# Patient Record
Sex: FEMALE | Race: WHITE | NOT HISPANIC OR LATINO | URBAN - METROPOLITAN AREA
[De-identification: names, ages, dates, MRNs, and addresses within clinical notes are randomized per-mention and may not be internally consistent; named-entity substitution may affect disease eponyms.]

---

## 2021-05-28 ENCOUNTER — OFFICE VISIT (OUTPATIENT)
Dept: URBAN - METROPOLITAN AREA CLINIC 12 | Facility: CLINIC | Age: 36
End: 2021-05-28
Payer: COMMERCIAL

## 2021-05-28 VITALS
HEIGHT: 64 IN | BODY MASS INDEX: 23.63 KG/M2 | DIASTOLIC BLOOD PRESSURE: 73 MMHG | HEART RATE: 83 BPM | SYSTOLIC BLOOD PRESSURE: 116 MMHG | WEIGHT: 138.4 LBS | TEMPERATURE: 97.1 F

## 2021-05-28 DIAGNOSIS — Z83.71 FAMILY HISTORY OF COLONIC POLYPS: ICD-10-CM

## 2021-05-28 DIAGNOSIS — Z34.91 PREGNANT AND NOT YET DELIVERED IN FIRST TRIMESTER: ICD-10-CM

## 2021-05-28 DIAGNOSIS — R19.7 DIARRHEA, UNSPECIFIED TYPE: ICD-10-CM

## 2021-05-28 DIAGNOSIS — R15.2 FECAL URGENCY: ICD-10-CM

## 2021-05-28 PROBLEM — 77386006: Status: ACTIVE | Noted: 2021-05-28

## 2021-05-28 PROCEDURE — 99244 OFF/OP CNSLTJ NEW/EST MOD 40: CPT | Performed by: INTERNAL MEDICINE

## 2021-05-28 RX ORDER — SERTRALINE HYDROCHLORIDE 50 MG/1
1 TABLET TABLET, FILM COATED ORAL ONCE A DAY
Status: ACTIVE | COMMUNITY

## 2021-05-28 RX ORDER — ALPRAZOLAM 0.25 MG/1
1 TABLET TABLET ORAL PRN
Status: ACTIVE | COMMUNITY

## 2021-05-28 RX ORDER — ACETAMINOPHEN 500 MG
1 CAPSULE TABLET ORAL QD
Status: ACTIVE | COMMUNITY

## 2021-05-28 NOTE — HPI-TODAY'S VISIT:
36 yo  woman presents for GI evluation on referral from Dr. Ruiz.  A copy of this note will go to Dr. Ruiz.  Patient has had 2 years or so of intermittent loose stools and diarrhea which seems to be provoked by stress. She has fecal urgency where she will have to have a bowel movement this seems to occur especially when she is traveling. Is become problematic for her and limited her social life as well with her . In fact she states that she takes Imodium 2 tablets before she goes out on car rides. There has been no melena or hematochezia or anemia or weight loss. She states that she is actually gained some weight. She has not noticed any food triggers in fact she has kept a food journal and has not found anything in particular causes her promotes her symptoms other than possibly anxiety. There is no history of dairy or gluten problems. There's no family history of celiac disease or inflammatory bowel disease or similar problems.  Patient did see her primary care provider and was started on Zoloft 25 mg a day which helped slightly. She then did a follow up there and increased it to 50 mg per day but still has the symptoms. She takes Xanax p.r.n. before flight as she has some anxiety increased during turbulence. Otherwise she does not take Xanax very often.  There has been no abdominal pain. There's been no chest pain or shortness of breath or palpitations or fatigue. Patient has been vaccinated with COVID-19 vaccinations x2.   Of note patient is now 6 weeks gestation is playing to see her first OB visit on June 7, 2021.   Patient did take "renew my life" probiotic but has not found any improvement with that.   She has not had stool studies for infection or recent labs.

## 2021-05-28 NOTE — PHYSICAL EXAM HENT:
Left voicemail for Sherman at  to call back and go over COVID screening questions for Buzz.   head is normocephalic, atraumatic. Face within normal limits, External ears within normal limits. Patient wearing mask in situ

## 2021-06-03 ENCOUNTER — LAB OUTSIDE AN ENCOUNTER (OUTPATIENT)
Dept: URBAN - METROPOLITAN AREA CLINIC 12 | Facility: CLINIC | Age: 36
End: 2021-06-03

## 2021-06-06 LAB
A/G RATIO: 2.3
ALBUMIN: 4.5
ALKALINE PHOSPHATASE: 38
ALT (SGPT): 12
AST (SGOT): 17
BASO (ABSOLUTE): 0
BASOS: 0
BILIRUBIN, TOTAL: 0.7
BUN/CREATININE RATIO: 25
BUN: 15
C-REACTIVE PROTEIN, QUANT: <1
CALCIUM: 9.2
CARBON DIOXIDE, TOTAL: 21
CHLORIDE: 101
CREATININE: 0.6
EGFR IF AFRICN AM: 139
EGFR IF NONAFRICN AM: 120
EOS (ABSOLUTE): 0
EOS: 0
GLOBULIN, TOTAL: 2
GLUCOSE: 94
HEMATOCRIT: 38.1
HEMATOLOGY COMMENTS:: (no result)
HEMOGLOBIN: 12.7
IMMATURE CELLS: (no result)
IMMATURE GRANS (ABS): 0
IMMATURE GRANULOCYTES: 0
IMMUNOGLOBULIN A, QN, SERUM: 67
LYMPHS (ABSOLUTE): 1.4
LYMPHS: 17
MCH: 30.4
MCHC: 33.3
MCV: 91
MONOCYTES(ABSOLUTE): 0.7
MONOCYTES: 8
NEUTROPHILS (ABSOLUTE): 6.3
NEUTROPHILS: 75
NRBC: (no result)
PLATELETS: 243
POTASSIUM: 4.6
PROTEIN, TOTAL: 6.5
RBC: 4.18
RDW: 12.1
SEDIMENTATION RATE-WESTERGREN: 3
SODIUM: 135
T-TRANSGLUTAMINASE (TTG) IGA: <2
T-TRANSGLUTAMINASE (TTG) IGG: 3
T4,FREE(DIRECT): 0.99
TSH: 0.73
WBC: 8.4

## 2021-06-07 LAB — GASTROINTESTINAL PATHOGEN: (no result)

## 2021-08-24 ENCOUNTER — DASHBOARD ENCOUNTERS (OUTPATIENT)
Age: 36
End: 2021-08-24

## 2021-08-24 ENCOUNTER — OFFICE VISIT (OUTPATIENT)
Dept: URBAN - METROPOLITAN AREA CLINIC 12 | Facility: CLINIC | Age: 36
End: 2021-08-24
Payer: COMMERCIAL

## 2021-08-24 DIAGNOSIS — Z86.73 HISTORY OF CVA (CEREBROVASCULAR ACCIDENT) WITHOUT RESIDUAL DEFICITS: ICD-10-CM

## 2021-08-24 DIAGNOSIS — R19.7 DIARRHEA, UNSPECIFIED TYPE: ICD-10-CM

## 2021-08-24 DIAGNOSIS — Z34.92 PREGNANT AND NOT YET DELIVERED IN SECOND TRIMESTER: ICD-10-CM

## 2021-08-24 DIAGNOSIS — Z83.71 FAMILY HISTORY OF COLONIC POLYPS: ICD-10-CM

## 2021-08-24 PROBLEM — 429993008: Status: ACTIVE | Noted: 2021-08-24

## 2021-08-24 PROBLEM — 429969003: Status: ACTIVE | Noted: 2021-05-28

## 2021-08-24 PROBLEM — 48694002: Status: ACTIVE | Noted: 2021-05-28

## 2021-08-24 PROBLEM — 62315008: Status: ACTIVE | Noted: 2021-05-28

## 2021-08-24 PROBLEM — 77386006: Status: ACTIVE | Noted: 2021-08-24

## 2021-08-24 PROCEDURE — 99214 OFFICE O/P EST MOD 30 MIN: CPT | Performed by: INTERNAL MEDICINE

## 2021-08-24 RX ORDER — ALPRAZOLAM 0.25 MG/1
1 TABLET TABLET ORAL PRN
Status: DISCONTINUED | COMMUNITY

## 2021-08-24 RX ORDER — ACETAMINOPHEN 500 MG
1 CAPSULE TABLET ORAL QD
Status: ACTIVE | COMMUNITY

## 2021-08-24 RX ORDER — ENOXAPARIN SODIUM 30 MG/.3ML
0.3 ML INJECTION SUBCUTANEOUS
Status: ACTIVE | COMMUNITY

## 2021-08-24 RX ORDER — SERTRALINE HYDROCHLORIDE 50 MG/1
1 TABLET TABLET, FILM COATED ORAL ONCE A DAY
Status: ACTIVE | COMMUNITY

## 2021-08-24 NOTE — HPI-TODAY'S VISIT:
This is a 35-year-old  woman who presents for a followup in the clinic. She was last seen here on May 28, 2021.  She has a long history of fecal urgency and has a history of loose stools and diarrhea. She has a family history of colon polyps and also has a history of anxiety.   Patient has a history of stroke and had full recovery due to foramen ovale (PFO) and was too small to apparently correct per patient. She has been on baby aspirin but now that she is pregnant she was advised to go on Lovenox which she has been taking since she is 17 weeks gestation. She is currently now 19 weeks gestation with an estimated date of delivery on January 16, 2022.   She has a history of loose stools and diarrhea as mentioned. No abdominal pain no nausea vomiting fever chills or sweats. No anemia. No weight loss. She has been gaining weight appropriately during the pregnancy. She does go to her obstetrician placement regularly. She sees Dr. Kamryn Ruiz for her primary care.   She has altered her dietary habits out she is pregnant and eating better. She states that her bowel movements have somewhat improved and I did advise that she start taking align once daily at last visit which she has been doing. She also has increased her Zoloft to 50 mg per day through her primary care doctor and that may have also helped.   Previous term pregnancy she was taking Imodium as needed but has not taken this during a pregnancy.   Patient had negative testing including normal sed rate, normal CRP, normal celiac panel normal CMP CBC and normal stool studies for infection.  She had normal thyroid studies. Her IgA was slightly low at 67.    She also was supposed to get a stool lactoferrin test but the lab did not get this done.

## 2022-02-22 ENCOUNTER — OFFICE VISIT (OUTPATIENT)
Dept: URBAN - METROPOLITAN AREA CLINIC 12 | Facility: CLINIC | Age: 37
End: 2022-02-22